# Patient Record
Sex: FEMALE | Race: WHITE | Employment: OTHER | URBAN - METROPOLITAN AREA
[De-identification: names, ages, dates, MRNs, and addresses within clinical notes are randomized per-mention and may not be internally consistent; named-entity substitution may affect disease eponyms.]

---

## 2024-10-25 ENCOUNTER — TELEPHONE (OUTPATIENT)
Age: 74
End: 2024-10-25

## 2024-11-08 NOTE — TELEPHONE ENCOUNTER
Patient's sister, Sigrid, called to schedule appt for Pt. She was told by [facility]  coordinator that they were unable to reach our office and for her to reach out to us. Please call [phone]

## 2025-02-11 ENCOUNTER — TRANSCRIBE ORDERS (OUTPATIENT)
Facility: HOSPITAL | Age: 75
End: 2025-02-11

## 2025-02-11 DIAGNOSIS — Z00.00 HEALTHCARE MAINTENANCE: Primary | ICD-10-CM

## 2025-05-19 ENCOUNTER — TELEPHONE (OUTPATIENT)
Age: 75
End: 2025-05-19

## 2025-05-19 NOTE — TELEPHONE ENCOUNTER
Called and left message to reschedule appt on 5/27 to this week.  Asked that they call back to reschedule.

## 2025-05-28 ENCOUNTER — OFFICE VISIT (OUTPATIENT)
Age: 75
End: 2025-05-28

## 2025-05-28 DIAGNOSIS — N39.0 RECURRENT UTI: ICD-10-CM

## 2025-05-28 DIAGNOSIS — F79 INTELLECTUAL DISABILITY: ICD-10-CM

## 2025-05-28 DIAGNOSIS — R44.3 HALLUCINATION: ICD-10-CM

## 2025-05-28 DIAGNOSIS — G80.9 CEREBRAL PALSY, UNSPECIFIED TYPE (HCC): Primary | ICD-10-CM

## 2025-05-28 DIAGNOSIS — R41.3 MEMORY LOSS: ICD-10-CM

## 2025-05-28 DIAGNOSIS — F22 DELUSION (HCC): ICD-10-CM

## 2025-05-28 NOTE — PROGRESS NOTES
objective testing  Patient will be provided with review of results, impressions, and recommendations during feedback session  Patient will be encouraged to follow-up with referring provider for ongoing management    TIME DOCUMENTATION:  Clinical Interview: 1300 - 1340 = 40 minutes    BILLING:  90791x1

## 2025-06-04 ENCOUNTER — PROCEDURE VISIT (OUTPATIENT)
Age: 75
End: 2025-06-04

## 2025-06-04 DIAGNOSIS — N39.0 RECURRENT UTI: ICD-10-CM

## 2025-06-04 DIAGNOSIS — F03.90 MAJOR NEUROCOGNITIVE DISORDER (HCC): ICD-10-CM

## 2025-06-04 DIAGNOSIS — R44.3 HALLUCINATION: ICD-10-CM

## 2025-06-04 DIAGNOSIS — F22 DELUSION (HCC): ICD-10-CM

## 2025-06-04 DIAGNOSIS — F79 INTELLECTUAL DISABILITY: ICD-10-CM

## 2025-06-04 DIAGNOSIS — G80.9 CEREBRAL PALSY, UNSPECIFIED TYPE (HCC): Primary | ICD-10-CM

## 2025-06-05 ENCOUNTER — TELEPHONE (OUTPATIENT)
Age: 75
End: 2025-06-05

## 2025-06-23 ENCOUNTER — OFFICE VISIT (OUTPATIENT)
Age: 75
End: 2025-06-23

## 2025-06-23 DIAGNOSIS — G80.9 CEREBRAL PALSY, UNSPECIFIED TYPE (HCC): ICD-10-CM

## 2025-06-23 DIAGNOSIS — R44.3 HALLUCINATION: ICD-10-CM

## 2025-06-23 DIAGNOSIS — F03.90 MAJOR NEUROCOGNITIVE DISORDER (HCC): Primary | ICD-10-CM

## 2025-06-23 DIAGNOSIS — N39.0 RECURRENT UTI: ICD-10-CM

## 2025-06-23 DIAGNOSIS — F79 INTELLECTUAL DISABILITY: ICD-10-CM

## 2025-06-23 DIAGNOSIS — F22 DELUSION (HCC): ICD-10-CM

## 2025-06-23 NOTE — PROGRESS NOTES
Chief complaint: Patient presented for review of previously completed neuropsychological evaluation and discussion of impressions/recommendations.    Prior to seeing the patient for today's visit, I reviewed pertinent records, including the previously completed report, the records in Epic, and any updated visits from other providers since the patient's last visit.    I provided feedback services related to the previously completed report. Attendees included: Patient and her sister and niece. Education was provided regarding my diagnostic impressions, and we discussed treatment plan/options. Attendees were provided with the opportunity to ask questions, which were answered to the best of my ability.    We discussed, in detail, the following:  Reviewed findings from evaluation including test results, diagnosis, and suspected contributing factors  Discussed recommendations outlined in report  Answered questions to the best of my ability    The patient needs to:   Follow up with referring provider for ongoing management    The patient had the following reactions to recommendations: Patient's family reported understanding results and recommendations.    ASSESSMENT:   Diagnosis Orders   1. Major neurocognitive disorder (HCC)        2. Intellectual disability        3. Cerebral palsy, unspecified type (HCC)        4. Recurrent UTI        5. Hallucination        6. Delusion (HCC)            TIME SPENT PROVIDING SERVICES:   31 minutes     BILLING:  96132 x 1 Units    *Code 71228 Neuropsychological testing evaluation services include: Integration of patient data, interpretation of standardized test results and clinical data, clinical decision-making, treatment planning and report, and interactive feedback to the patient, family member(s) or caregiver(s), when performed.

## 2025-06-23 NOTE — PROGRESS NOTES
Neuropsychological Evaluation Report      Patient Name: yCnthia Alex  YOB: 1950    Age: 74 y.o.  Date of Intake: 2025   Education: 12 Date of Testin2025   Gender: Female Ethnicity: White     Referring Provider: Dr. Caro     REASON FOR REFERRAL AND EVALUATION PROCEDURES:  Cynthia Alex  was referred for neuropsychological evaluation by her Primary Care Physician to obtain a quantitative assessment of her current level of neurocognitive functioning, assist in differential diagnosis, and aid in individualized treatment planning. The patient understood the rationale and procedures for evaluation, as well as the limits to confidentiality, and they agreed to participate. The patient consented to have this report made available to her  treating providers through her  electronic medical records. This evaluation was completed with the patient by Gonzales Hudson PsyD with the exception of testing by technician, which was completed by MARKO Abad under the supervision of Dr. Hudson.  History Sources: Patient, Relative (Sister), Medical Record, and Test Data    SUMMARY AND IMPRESSION:  The following section is a summary of the patient's pertinent test results and impressions. A more thorough review of the patient's background and test scores can be found below.    The breadth of cognitive assessment was limited due to the patient's level of cognitive impairment.  On a brief measure of global cognitive functioning, the patient performed in the profoundly impaired range (MoCA=5/30 after education correction).  When individuals perform in this range, additional testing is contraindicated as it is unlikely to contribute incremental validity in refining the patient's differential diagnosis and treatment planning.    Profound global cognitive impairment is not reflective of localized dysfunction, nor is it not specific to any etiology.  Instead, it indicates the presence of a diffuse process.